# Patient Record
Sex: MALE | Race: WHITE | URBAN - METROPOLITAN AREA
[De-identification: names, ages, dates, MRNs, and addresses within clinical notes are randomized per-mention and may not be internally consistent; named-entity substitution may affect disease eponyms.]

---

## 2018-02-21 ENCOUNTER — INITIAL CONSULT (OUTPATIENT)
Dept: NEUROLOGY | Age: 38
End: 2018-02-21

## 2018-02-21 ENCOUNTER — HOSPITAL ENCOUNTER (OUTPATIENT)
Dept: OTHER | Age: 38
Discharge: HOME | End: 2018-02-21
Attending: PSYCHIATRY & NEUROLOGY | Admitting: PSYCHIATRY & NEUROLOGY

## 2018-02-21 VITALS
HEART RATE: 85 BPM | DIASTOLIC BLOOD PRESSURE: 80 MMHG | SYSTOLIC BLOOD PRESSURE: 110 MMHG | BODY MASS INDEX: 32.96 KG/M2 | WEIGHT: 230.2 LBS | HEIGHT: 70 IN | OXYGEN SATURATION: 96 % | RESPIRATION RATE: 16 BRPM

## 2018-02-21 DIAGNOSIS — G44.52 NEW PERSISTENT DAILY HEADACHE: Primary | ICD-10-CM

## 2018-02-21 DIAGNOSIS — F51.01 PRIMARY INSOMNIA: ICD-10-CM

## 2018-02-21 DIAGNOSIS — G44.221 CHRONIC TENSION-TYPE HEADACHE, INTRACTABLE: ICD-10-CM

## 2018-02-21 DIAGNOSIS — F17.200 TOBACCO DEPENDENCE: ICD-10-CM

## 2018-02-21 DIAGNOSIS — R41.3 MEMORY LOSS: ICD-10-CM

## 2018-02-21 PROCEDURE — 99204 OFFICE O/P NEW MOD 45 MIN: CPT | Performed by: PSYCHIATRY & NEUROLOGY

## 2018-02-21 RX ORDER — VARENICLINE TARTRATE 1 MG/1
1 TABLET, FILM COATED ORAL 2 TIMES DAILY
COMMUNITY

## 2018-02-21 RX ORDER — AMITRIPTYLINE HYDROCHLORIDE 25 MG/1
25 TABLET, FILM COATED ORAL NIGHTLY
Qty: 30 TABLET | Refills: 3 | Status: SHIPPED | OUTPATIENT
Start: 2018-02-21

## 2018-02-21 RX ORDER — ACETAMINOPHEN, ASPIRIN AND CAFFEINE 250; 250; 65 MG/1; MG/1; MG/1
1 TABLET, FILM COATED ORAL EVERY 6 HOURS PRN
COMMUNITY

## 2018-02-21 ASSESSMENT — ENCOUNTER SYMPTOMS
GASTROINTESTINAL NEGATIVE: 1
RESPIRATORY NEGATIVE: 1
EYES NEGATIVE: 1

## 2018-02-21 NOTE — PROGRESS NOTES
The patient is a 44y.o. years old male who  was referred by Jeff Nur NP  for consultation regarding c hronic daily headaches. HPI:  The patient describes chronic daily headaches since 2005. Headaches are daily and persistent. Location is holocranial and can be dull ache and throbbing. Degree is moderate to severe 6-7/10. No triggers. Headache is all day. No associated symptoms. No nausea or vomiting. No weakness or numbness or visual changes. He was started on Excedrin three weeks ago. He takes them few times per week but not daily. He did not try any other medication. No hx of head trauma or migraines. He did not try any preventive medication in the past. No recent MRI. He is C/O short term memory loss. Onset was 10 years ago. Sx are episodic but could be daily. Degree could be moderate to severe. he will need prompt from his family. He continues to be independent in his ADL. No family history of dementia. No recent stressors. He has occasional insomnia but no symptoms of sleep apnea. Denies any psychosis or hallucination. Denies any driving issues or getting lost in directions. No word finding difficulties or language disturbance. No severe depression but he has history of ADD. He does smoke but denies use of drugs. He does not drink. No other new symptoms today. Other review of system was unremarkable. Past Medical History:   Diagnosis Date    GERD (gastroesophageal reflux disease)     Hypertension     Migraine      Prior to Visit Medications    Medication Sig Taking?  Authorizing Provider   varenicline (CHANTIX) 1 MG tablet Take 1 mg by mouth 2 times daily Yes Historical Provider, MD   aspirin-acetaminophen-caffeine (Viri Martinez) [id number] MG per tablet Take 1 tablet by mouth every 6 hours as needed for Headaches Yes Historical Provider, MD     No Known Allergies  Social History   Substance Use Topics    Smoking status: Former Smoker     Packs/day: 1.00     Types: triceps 2/4, brachial radialis 2/4, knee 2/4 and ankle 2/4. Planters: flexor bilaterally. Coordination: no pronator drift, no dysmetria. Finger nose finger testing within normal limits. Sensation: normal to all modalities. Gait/Posture: steady      Medical decision making:  I personally reviewed social history, past medical history, medications, allergy, surgical history, and family history as documented in the patient's electronic health records. Labs and/or neuroimaging and other test results reviewed and discussed with the patient. Reviewed notes from other physicianss. Provided patient education regarding risk, benefits and treatment options as well as adherence to medication regimen and side effect from these medications. Last CT head in 2014. Assessment:  Chronic daily headache, intractable  Chronic tension headache, intractable  Medication overuse headache  Chronic cognitive impairment, possible MCI. Could be related to chronic headaches and underlying ADD. ADD  Insomnia  Smoking      Plan:  MRI brain  Check  blood test for CBC, CMP, LFT, SG, ESR, B12, folate and TSH  Start amitriptyline 25 mg at night which could help with insomnia and headache prevention  Side effect was discussed today including sedation and weight gain  Different cognitive therapy and behavior therapy were addressed for his memory impairment. Improving sleep hygiene for his insomnia  Smoking cessation  Headache diary  Avoid OTC more than two days a week. Discussed possibility of rebound headaches.   Hydration  Avoid excessive caffeinated drinks  Follow-up in two month

## 2018-02-21 NOTE — LETTER
Aydee Shi MD    [facility]  [address] [place], [address] [place] [address]    [phone] (Phone)  [phone] (Fax)    Dear Dr Teagan Buck NP    I had the pleasure of seeing your patient Korina Chen  1980 today. I have attached a detailed summary below:    The patient is a 44y.o. years old male who  was referred by Teagan Buck NP  for consultation regarding c hronic daily headaches. HPI:  The patient describes chronic daily headaches since . Headaches are daily and persistent. Location is holocranial and can be dull ache and throbbing. Degree is moderate to severe 6-7/10. No triggers. Headache is all day. No associated symptoms. No nausea or vomiting. No weakness or numbness or visual changes. He was started on Excedrin three weeks ago. He takes them few times per week but not daily. He did not try any other medication. No hx of head trauma or migraines. He did not try any preventive medication in the past. No recent MRI. He is C/O short term memory loss. Onset was 10 years ago. Sx are episodic but could be daily. Degree could be moderate to severe. he will need prompt from his family. He continues to be independent in his ADL. No family history of dementia. No recent stressors. He has occasional insomnia but no symptoms of sleep apnea. Denies any psychosis or hallucination. Denies any driving issues or getting lost in directions. No word finding difficulties or language disturbance. No severe depression but he has history of ADD. He does smoke but denies use of drugs. He does not drink. No other new symptoms today. Other review of system was unremarkable. Past Medical History:   Diagnosis Date    GERD (gastroesophageal reflux disease)     Hypertension     Migraine      Prior to Visit Medications    Medication Sig Taking?  Authorizing Provider

## 2018-02-22 LAB
A/G RATIO: 2.3 (ref 1.1–2.2)
ALBUMIN SERPL-MCNC: 4.8 G/DL (ref 3.4–5)
ALP BLD-CCNC: 68 U/L (ref 40–129)
ALT SERPL-CCNC: 43 U/L (ref 10–40)
ANION GAP SERPL CALCULATED.3IONS-SCNC: 13 MMOL/L (ref 3–16)
AST SERPL-CCNC: 15 U/L (ref 15–37)
BASOPHILS ABSOLUTE: 0.1 K/UL (ref 0–0.2)
BASOPHILS RELATIVE PERCENT: 1 %
BILIRUB SERPL-MCNC: 0.5 MG/DL (ref 0–1)
BILIRUBIN DIRECT: <0.2 MG/DL (ref 0–0.3)
BILIRUBIN, INDIRECT: NORMAL MG/DL (ref 0–1)
BUN BLDV-MCNC: 13 MG/DL (ref 7–20)
CALCIUM SERPL-MCNC: 9.3 MG/DL (ref 8.3–10.6)
CHLORIDE BLD-SCNC: 103 MMOL/L (ref 99–110)
CO2: 26 MMOL/L (ref 21–32)
CREAT SERPL-MCNC: 0.8 MG/DL (ref 0.9–1.3)
EOSINOPHILS ABSOLUTE: 0.2 K/UL (ref 0–0.6)
EOSINOPHILS RELATIVE PERCENT: 2.5 %
FOLATE: 12.63 NG/ML (ref 4.78–24.2)
GFR AFRICAN AMERICAN: >60
GFR NON-AFRICAN AMERICAN: >60
GLOBULIN: 2.1 G/DL
GLUCOSE BLD-MCNC: 116 MG/DL (ref 70–99)
HCT VFR BLD CALC: 45.1 % (ref 40.5–52.5)
HEMOGLOBIN: 16.4 G/DL (ref 13.5–17.5)
LYMPHOCYTES ABSOLUTE: 2.4 K/UL (ref 1–5.1)
LYMPHOCYTES RELATIVE PERCENT: 30.3 %
MCH RBC QN AUTO: 32.4 PG (ref 26–34)
MCHC RBC AUTO-ENTMCNC: 36.5 G/DL (ref 31–36)
MCV RBC AUTO: 89 FL (ref 80–100)
MONOCYTES ABSOLUTE: 0.7 K/UL (ref 0–1.3)
MONOCYTES RELATIVE PERCENT: 8.3 %
NEUTROPHILS ABSOLUTE: 4.7 K/UL (ref 1.7–7.7)
NEUTROPHILS RELATIVE PERCENT: 57.9 %
PDW BLD-RTO: 12.3 % (ref 12.4–15.4)
PLATELET # BLD: 168 K/UL (ref 135–450)
PMV BLD AUTO: 9.2 FL (ref 5–10.5)
POTASSIUM SERPL-SCNC: 3.9 MMOL/L (ref 3.5–5.1)
RBC # BLD: 5.07 M/UL (ref 4.2–5.9)
SEDIMENTATION RATE, ERYTHROCYTE: 3 MM/HR (ref 0–15)
SODIUM BLD-SCNC: 142 MMOL/L (ref 136–145)
TOTAL PROTEIN: 6.9 G/DL (ref 6.4–8.2)
TSH SERPL DL<=0.05 MIU/L-ACNC: 2.19 UIU/ML (ref 0.27–4.2)
VITAMIN B-12: 507 PG/ML (ref 211–911)
WBC # BLD: 8.1 K/UL (ref 4–11)

## 2018-03-07 ENCOUNTER — HOSPITAL ENCOUNTER (OUTPATIENT)
Dept: MRI IMAGING | Age: 38
Discharge: HOME | End: 2018-03-07
Attending: PSYCHIATRY & NEUROLOGY | Admitting: PSYCHIATRY & NEUROLOGY

## 2018-03-07 DIAGNOSIS — G44.52 NEW DAILY PERSISTENT HEADACHE (NDPH): ICD-10-CM

## 2018-03-07 DIAGNOSIS — G44.52 NEW PERSISTENT DAILY HEADACHE: ICD-10-CM
